# Patient Record
Sex: FEMALE | Race: OTHER | HISPANIC OR LATINO | Employment: UNEMPLOYED | URBAN - METROPOLITAN AREA
[De-identification: names, ages, dates, MRNs, and addresses within clinical notes are randomized per-mention and may not be internally consistent; named-entity substitution may affect disease eponyms.]

---

## 2023-01-27 DIAGNOSIS — E78.2 MIXED HYPERLIPIDEMIA: ICD-10-CM

## 2023-01-27 RX ORDER — ATORVASTATIN CALCIUM 10 MG/1
10 TABLET, FILM COATED ORAL DAILY
Qty: 90 TABLET | Refills: 2 | Status: SHIPPED | OUTPATIENT
Start: 2023-01-27

## 2023-02-16 PROBLEM — Z28.21 IMMUNIZATION DECLINED: Status: RESOLVED | Noted: 2022-11-15 | Resolved: 2023-02-16

## 2023-05-24 ENCOUNTER — HOSPITAL ENCOUNTER (EMERGENCY)
Facility: HOSPITAL | Age: 58
Discharge: HOME | End: 2023-05-24
Attending: EMERGENCY MEDICINE

## 2023-05-24 VITALS
DIASTOLIC BLOOD PRESSURE: 77 MMHG | RESPIRATION RATE: 18 BRPM | HEART RATE: 86 BPM | TEMPERATURE: 98.7 F | OXYGEN SATURATION: 96 % | TEMPERATURE: 98.7 F | WEIGHT: 230.1 LBS | OXYGEN SATURATION: 96 % | RESPIRATION RATE: 18 BRPM | SYSTOLIC BLOOD PRESSURE: 134 MMHG | DIASTOLIC BLOOD PRESSURE: 77 MMHG | HEART RATE: 86 BPM | WEIGHT: 230.1 LBS | SYSTOLIC BLOOD PRESSURE: 134 MMHG

## 2023-05-24 DIAGNOSIS — T78.40XA ALLERGIC REACTION, INITIAL ENCOUNTER: Primary | ICD-10-CM

## 2023-05-24 RX ORDER — FAMOTIDINE 10 MG/ML
20 INJECTION, SOLUTION INTRAVENOUS ONCE
Status: COMPLETED | OUTPATIENT
Start: 2023-05-24 | End: 2023-05-24

## 2023-05-24 RX ORDER — EPINEPHRINE 1 MG/ML
0.3 INJECTION, SOLUTION, CONCENTRATE INTRAVENOUS ONCE
Status: COMPLETED | OUTPATIENT
Start: 2023-05-24 | End: 2023-05-24

## 2023-05-24 RX ORDER — METHYLPREDNISOLONE 4 MG/1
TABLET ORAL
Qty: 21 TABLET | Refills: 0 | Status: SHIPPED | OUTPATIENT
Start: 2023-05-24

## 2023-05-24 RX ORDER — CANDESARTAN 8 MG/1
8 TABLET ORAL DAILY
Qty: 30 TABLET | Refills: 0 | Status: SHIPPED | OUTPATIENT
Start: 2023-05-24

## 2023-05-24 RX ORDER — DIPHENHYDRAMINE HCL 25 MG
25 TABLET ORAL EVERY 6 HOURS PRN
Qty: 30 TABLET | Refills: 0 | Status: SHIPPED | OUTPATIENT
Start: 2023-05-24

## 2023-05-24 RX ORDER — EPINEPHRINE 0.3 MG/.3ML
0.3 INJECTION SUBCUTANEOUS ONCE
Qty: 0.6 ML | Refills: 0 | Status: SHIPPED | OUTPATIENT
Start: 2023-05-24 | End: 2023-05-24

## 2023-05-24 RX ORDER — DIPHENHYDRAMINE HYDROCHLORIDE 50 MG/ML
25 INJECTION INTRAMUSCULAR; INTRAVENOUS ONCE
Status: COMPLETED | OUTPATIENT
Start: 2023-05-24 | End: 2023-05-24

## 2023-05-24 RX ORDER — METHYLPREDNISOLONE SODIUM SUCCINATE 125 MG/2ML
125 INJECTION, POWDER, LYOPHILIZED, FOR SOLUTION INTRAMUSCULAR; INTRAVENOUS ONCE
Status: COMPLETED | OUTPATIENT
Start: 2023-05-24 | End: 2023-05-24

## 2023-05-24 RX ORDER — FAMOTIDINE 20 MG/1
20 TABLET, FILM COATED ORAL 2 TIMES DAILY
Qty: 30 TABLET | Refills: 0 | Status: SHIPPED | OUTPATIENT
Start: 2023-05-24

## 2023-05-24 RX ADMIN — DIPHENHYDRAMINE HYDROCHLORIDE 25 MG: 50 INJECTION, SOLUTION INTRAMUSCULAR; INTRAVENOUS at 01:17

## 2023-05-24 RX ADMIN — SODIUM CHLORIDE 1000 ML: 0.9 INJECTION, SOLUTION INTRAVENOUS at 01:12

## 2023-05-24 RX ADMIN — FAMOTIDINE 20 MG: 10 INJECTION INTRAVENOUS at 01:15

## 2023-05-24 RX ADMIN — METHYLPREDNISOLONE SODIUM SUCCINATE 125 MG: 125 INJECTION, POWDER, FOR SOLUTION INTRAMUSCULAR; INTRAVENOUS at 01:13

## 2023-05-24 RX ADMIN — EPINEPHRINE 0.3 MG: 1 INJECTION, SOLUTION, CONCENTRATE INTRAVENOUS at 01:18

## 2023-05-24 NOTE — ED PROVIDER NOTES
History  Chief Complaint   Patient presents with   • Allergic Reaction     Patient reports taking dextromorphan and guaifenesin at home for cold at 1700  Began to experience hives on bilateral arms and chest  Took Benadryl at 1800 for hives  Denies SOB but states that she lost her voice  61-year-old female with history of allergy to penicillin presents complaining of diffuse hives and tongue swelling noted after taking Robitussin  Patient is unsure if she ever had this medication in the past   Patient states that her last dose was around 6 PM today however shortly after stated that she felt like she could not swallow and that her tongue was swollen  Patient also noted hives to her arms  Patient tried taking 25 mg of Benadryl at home however became concerned when her tongue and lips started to swell and came to the emergency department  Denies history of similar  Denies any other complaints  History provided by:  Patient   used: No        None       No past medical history on file  No past surgical history on file  No family history on file  I have reviewed and agree with the history as documented  No existing history information found  No existing history information found  Review of Systems   Constitutional: Negative  Negative for chills and fatigue  HENT: Positive for facial swelling  Negative for ear pain and sore throat  Eyes: Negative for photophobia and redness  Respiratory: Negative for apnea, cough and shortness of breath  Cardiovascular: Negative for chest pain  Gastrointestinal: Negative for abdominal pain, nausea and vomiting  Genitourinary: Negative for dysuria  Musculoskeletal: Negative for arthralgias, neck pain and neck stiffness  Skin: Positive for rash  Neurological: Negative for dizziness, tremors, syncope and weakness  Psychiatric/Behavioral: Negative for suicidal ideas         Physical Exam  Physical Exam  Constitutional: General: She is not in acute distress  Appearance: She is well-developed  She is not diaphoretic  HENT:      Mouth/Throat:      Comments: Mild angioedema of the tongue noted  Eyes:      Pupils: Pupils are equal, round, and reactive to light  Cardiovascular:      Rate and Rhythm: Normal rate and regular rhythm  Pulmonary:      Effort: Pulmonary effort is normal  No respiratory distress  Breath sounds: Normal breath sounds  Abdominal:      General: Bowel sounds are normal  There is no distension  Palpations: Abdomen is soft  Musculoskeletal:         General: Normal range of motion  Cervical back: Normal range of motion and neck supple  Skin:     General: Skin is warm and dry  Findings: Rash present  Comments: Raised blanchable erythematous pruritic rash with central clearing noted to bilateral upper extremities chest and back  Neurological:      Mental Status: She is alert and oriented to person, place, and time           Vital Signs  ED Triage Vitals [05/24/23 0055]   Temperature Pulse Respirations Blood Pressure SpO2   98 7 °F (37 1 °C) 104 20 157/86 97 %      Temp Source Heart Rate Source Patient Position - Orthostatic VS BP Location FiO2 (%)   Tympanic Monitor Sitting Left arm --      Pain Score       No Pain           Vitals:    05/24/23 0055   BP: 157/86   Pulse: 104   Patient Position - Orthostatic VS: Sitting         Visual Acuity      ED Medications  Medications   EPINEPHrine PF (ADRENALIN) 1 mg/mL injection 0 3 mg (0 3 mg Intramuscular Given 5/24/23 0118)   diphenhydrAMINE (BENADRYL) injection 25 mg (25 mg Intravenous Given 5/24/23 0117)   sodium chloride 0 9 % bolus 1,000 mL (1,000 mL Intravenous New Bag 5/24/23 0112)   Famotidine (PF) (PEPCID) injection 20 mg (20 mg Intravenous Given 5/24/23 0115)   methylPREDNISolone sodium succinate (Solu-MEDROL) injection 125 mg (125 mg Intravenous Given 5/24/23 0113)       Diagnostic Studies  Results Reviewed     None No orders to display              Procedures  Procedures         ED Course                               SBIRT 20yo+    Flowsheet Row Most Recent Value   Initial Alcohol Screen: US AUDIT-C     1  How often do you have a drink containing alcohol? 0 Filed at: 05/24/2023 0108   2  How many drinks containing alcohol do you have on a typical day you are drinking? 0 Filed at: 05/24/2023 0108   3a  Male UNDER 65: How often do you have five or more drinks on one occasion? 0 Filed at: 05/24/2023 0108   3b  FEMALE Any Age, or MALE 65+: How often do you have 4 or more drinks on one occassion? 0 Filed at: 05/24/2023 0108   Audit-C Score 0 Filed at: 05/24/2023 0108   JOSE D: How many times in the past year have you    Used an illegal drug or used a prescription medication for non-medical reasons? Never Filed at: 05/24/2023 7923                    Medical Decision Making  Discussed with attending  Patient had evidence of multisystemic involvement including noted angioedema with difficulty swallowing as well as rash consistent with urticaria  Strong concern for anaphylaxis at this time  Although vital stable plan to give supportive medications as well as epinephrine  Patient was given epinephrine and had dramatic improvement of symptoms  On reevaluation rash had resolved and patient no longer reported any swelling  Patient was observed in the emergency department for 3 hours without complaint  Patient was given prescription for EpiPen and educated on appropriate use  Given return precautions  Discharged home  Risk  Prescription drug management  Disposition  Final diagnoses:    Allergic reaction, initial encounter     Time reflects when diagnosis was documented in both MDM as applicable and the Disposition within this note     Time User Action Codes Description Comment    5/24/2023  3:31 AM Ct Tabares Add [T78 [id number]] Allergic reaction, initial encounter       ED Disposition     ED Disposition   Discharge    Condition   Stable    Date/Time   Wed May 24, 2023  3:31 AM    Comment   Cathy RamirezDodienatetamie discharge to home/self care  Follow-up Information     Follow up With Specialties Details Why Contact Info Additional [phone] [facility] Emergency Department Emergency Medicine Go to  If symptoms worsen [address] [id number] [address] [facility] Emergency Department          Patient's Medications   Discharge Prescriptions    CANDESARTAN (ATACAND) 8 MG TABLET    Take 1 tablet (8 mg total) by mouth daily       Start Date: 5/24/2023 End Date: --       Order Dose: 8 mg       Quantity: 30 tablet    Refills: 0    DIPHENHYDRAMINE (BENADRYL) 25 MG TABLET    Take 1 tablet (25 mg total) by mouth every 6 (six) hours as needed for itching       Start Date: 5/24/2023 End Date: --       Order Dose: 25 mg       Quantity: 30 tablet    Refills: 0    EPINEPHRINE (EPIPEN) 0 3 MG/0 3 ML SOAJ    Inject 0 3 mL (0 3 mg total) into a muscle once for 1 dose       Start Date: 5/24/2023 End Date: 5/24/2023       Order Dose: 0 3 mg       Quantity: 0 6 mL    Refills: 0    FAMOTIDINE (PEPCID) 20 MG TABLET    Take 1 tablet (20 mg total) by mouth 2 (two) times a day       Start Date: 5/24/2023 End Date: --       Order Dose: 20 mg       Quantity: 30 tablet    Refills: 0    METHYLPREDNISOLONE 4 MG TABLET THERAPY PACK    Use as directed on package       Start Date: 5/24/2023 End Date: --       Order Dose: --       Quantity: 21 tablet    Refills: 0       No discharge procedures on file      PDMP Review     None          ED Provider  Electronically Signed by           Marie Forbes PA-C  05/24/23 9830

## 2023-05-24 NOTE — DISCHARGE INSTRUCTIONS
Take medications as directed  Only use EpiPen for emergencies  After use return to emergency department  Follow-up with primary care

## 2023-07-12 DIAGNOSIS — I10 PRIMARY HYPERTENSION: ICD-10-CM

## 2023-07-12 RX ORDER — CANDESARTAN 8 MG/1
8 TABLET ORAL DAILY
Qty: 180 TABLET | Refills: 0 | Status: SHIPPED | OUTPATIENT
Start: 2023-07-12

## 2023-07-17 ENCOUNTER — TELEPHONE (OUTPATIENT)
Dept: FAMILY MEDICINE CLINIC | Facility: CLINIC | Age: 58
End: 2023-07-17

## 2023-07-17 NOTE — TELEPHONE ENCOUNTER
07/17/23    Pt left message. Was not able to hear message. From I was able to understand from the auto written message Pt wants to reschedule appt. Provided OBGYN Contact # [phone] on Voice Message. If Pt contacts office, please assist Pt with providing again OBGYN Contact Pt.

## 2023-07-19 ENCOUNTER — OFFICE VISIT (OUTPATIENT)
Dept: OBGYN CLINIC | Facility: CLINIC | Age: 58
End: 2023-07-19

## 2023-07-19 VITALS
WEIGHT: 228.4 LBS | BODY MASS INDEX: 36.71 KG/M2 | HEART RATE: 96 BPM | DIASTOLIC BLOOD PRESSURE: 85 MMHG | SYSTOLIC BLOOD PRESSURE: 137 MMHG | HEIGHT: 66 IN

## 2023-07-19 DIAGNOSIS — N94.9 FEMALE GENITAL LESION: Primary | ICD-10-CM

## 2023-07-19 PROCEDURE — 99213 OFFICE O/P EST LOW 20 MIN: CPT | Performed by: OBSTETRICS & GYNECOLOGY

## 2023-07-20 NOTE — PROGRESS NOTES
Assessment/Plan:     No problem-specific Assessment & Plan notes found for this encounter. Diagnoses and all orders for this visit:    Female genital lesion      Small cystic lesion, resolving on its own. RTO for annual exam.         Subjective:      Patient ID: Maryjo Landaverde is a 66 y.o. female presents with a cyst of the perineum. She states she had squeezed it and it has since decreased in size. She denies bleeding or vaginal discharge. HPI    The following portions of the patient's history were reviewed and updated as appropriate: allergies, current medications, past family history, past medical history, past social history, past surgical history and problem list.    Review of Systems      Objective:      /85   Pulse 96   Ht 5' 6" (1.676 m)   Wt 104 kg (228 lb 6.4 oz)   BMI 36.86 kg/m²          Physical Exam  Vitals and nursing note reviewed. Exam conducted with a chaperone present. Constitutional:       Appearance: Normal appearance. Pulmonary:      Effort: Pulmonary effort is normal.   Genitourinary:     Labia:         Right: No rash, tenderness, lesion or injury. Left: No rash, tenderness, lesion or injury. Vagina: Normal.          Comments: Cyst on perineum  Neurological:      General: No focal deficit present. Mental Status: She is alert and oriented to person, place, and time.    Psychiatric:         Mood and Affect: Mood normal.         Behavior: Behavior normal.

## 2023-09-18 ENCOUNTER — HOSPITAL ENCOUNTER (OUTPATIENT)
Dept: MAMMOGRAPHY | Facility: CLINIC | Age: 58
Discharge: HOME | End: 2023-09-18

## 2023-09-18 VITALS — WEIGHT: 228 LBS | HEIGHT: 66 IN | BODY MASS INDEX: 36.64 KG/M2

## 2023-09-18 DIAGNOSIS — Z12.31 ENCOUNTER FOR SCREENING MAMMOGRAM FOR MALIGNANT NEOPLASM OF BREAST: ICD-10-CM

## 2023-09-18 DIAGNOSIS — Z12.39 ENCOUNTER FOR SCREENING FOR MALIGNANT NEOPLASM OF BREAST, UNSPECIFIED SCREENING MODALITY: ICD-10-CM

## 2023-09-18 PROCEDURE — 77063 BREAST TOMOSYNTHESIS BI: CPT

## 2023-09-18 PROCEDURE — 77067 SCR MAMMO BI INCL CAD: CPT

## 2023-12-26 DIAGNOSIS — I10 PRIMARY HYPERTENSION: ICD-10-CM

## 2023-12-26 RX ORDER — CANDESARTAN 8 MG/1
8 TABLET ORAL DAILY
Qty: 180 TABLET | Refills: 0 | OUTPATIENT
Start: 2023-12-26

## 2023-12-27 DIAGNOSIS — I10 PRIMARY HYPERTENSION: ICD-10-CM

## 2023-12-27 RX ORDER — CANDESARTAN 8 MG/1
8 TABLET ORAL DAILY
Qty: 30 TABLET | Refills: 0 | Status: SHIPPED | OUTPATIENT
Start: 2023-12-27

## 2024-01-09 ENCOUNTER — TELEPHONE (OUTPATIENT)
Dept: FAMILY MEDICINE CLINIC | Facility: CLINIC | Age: 59
End: 2024-01-09

## 2024-01-09 NOTE — TELEPHONE ENCOUNTER
Patient message: Buenas tardes, mi nombre es Emily Gregorio, yo tengo win tyson para el día 15. A las 17:40 H de la tarde. ¿Vance qué sucede? Yo tengo alfonso un hongo en la nariz, tengo ya más de 2 semanas con eso ya yo sé que no era el frío, entonces yo quisiera que me llamaran para carol si me ponen breonna tyson con cualquier médico, o sea un médico que me pueda decir qué es lo que tengo en la nariz, pues estoy, tengo la nariz pelada y la tengo seca, la tengo, no la tengo cuate la nariz. Entonces mi número es [phone]. Mi fecha de nacimiento, [birth year] de abril de 1965. Ya me pueden llamar ahí Josafati Gregorio para que me digan algo, porque si cualquier médico me puede chequear. Es. Que con Kar así también cualquier médico que sea general me chequea de win vez la presión, porque para eso, para monitorearme la presión fue que me pusieron breonna tyson porque yo no tengo otra cosa, no tengo más nada, muchas jose y pasen buenas tardes.    Erick schedule 1/10/24

## 2024-01-10 ENCOUNTER — OFFICE VISIT (OUTPATIENT)
Dept: FAMILY MEDICINE CLINIC | Facility: CLINIC | Age: 59
End: 2024-01-10

## 2024-01-10 VITALS
BODY MASS INDEX: 37.45 KG/M2 | OXYGEN SATURATION: 96 % | DIASTOLIC BLOOD PRESSURE: 84 MMHG | HEART RATE: 88 BPM | SYSTOLIC BLOOD PRESSURE: 126 MMHG | RESPIRATION RATE: 18 BRPM | HEIGHT: 66 IN | TEMPERATURE: 98.5 F | WEIGHT: 233 LBS

## 2024-01-10 DIAGNOSIS — L30.9 DERMATITIS: Primary | ICD-10-CM

## 2024-01-10 DIAGNOSIS — Z20.822 CLOSE EXPOSURE TO COVID-19 VIRUS: ICD-10-CM

## 2024-01-10 DIAGNOSIS — R06.02 SHORTNESS OF BREATH: ICD-10-CM

## 2024-01-10 DIAGNOSIS — J34.89 NASAL DRYNESS: ICD-10-CM

## 2024-01-10 LAB
SARS-COV-2 AG UPPER RESP QL IA: NEGATIVE
VALID CONTROL: NORMAL

## 2024-01-10 PROCEDURE — 87811 SARS-COV-2 COVID19 W/OPTIC: CPT

## 2024-01-10 PROCEDURE — 99214 OFFICE O/P EST MOD 30 MIN: CPT

## 2024-01-10 RX ORDER — TRIAMCINOLONE ACETONIDE 1 MG/G
CREAM TOPICAL 2 TIMES DAILY
Qty: 45 G | Refills: 0 | Status: SHIPPED | OUTPATIENT
Start: 2024-01-10

## 2024-01-10 NOTE — PROGRESS NOTES
Name: Marbin Bowden      : 1965      MRN: [id number]  Encounter Provider: SELVIN Inman  Encounter Date: 1/10/2024   Encounter department: [facility]    Assessment & Plan     1. Dermatitis  Assessment & Plan:  - Apply triamcinolone to edge of both nares BID x7 days.  - Moisturize inside of nostrils with Ocean nasal spray PRN.    Orders:  -     triamcinolone (KENALOG) 0.1 % cream; Apply topically 2 (two) times a day    2. Nasal dryness  Assessment & Plan:  - Apply triamcinolone to edge of both nares BID x7 days.  - Moisturize inside of nostrils with Ocean nasal spray PRN.  - Avoid Flonase which can cause worsening bleeding.    Orders:  -     sodium chloride (OCEAN) 0.65 % nasal spray; 2 sprays into each nostril as needed (nasal dryness)    3. Shortness of breath  Assessment & Plan:  Reports shortness of breath with activity without accompanying symptoms. Lungs clear, O2 96%.  - Discussed possible causes, testing, and treatment, however pt declines all at this time.   - Return if is symptoms worsen, change, or fail to improve.       4. Close exposure to COVID-19 virus  -     POCT Rapid Covid Ag           Subjective     HPI    [place] language interpretation services were utilized for this visit.  Marbin presents to the office for a SAME DAY appt for c/o dry, peeling skin around the edge of her nostrils x3 weeks. She reports dry skin and dried blood inside her nostrils as well. Denies nosebleed, congestion/rhinorrhea, fever, or any recent illness. She states that 2 people in her home are currently COVID positive. Pt tried applying acyclovir cream to her nose for 4 days.  Pt also states that for at least a month she has become easily out of breath with everyday activity such as climbing stairs or dancing. She denies chest pain, palpations, dizziness, or any other accompanying symptoms. Pt states she used someone else's inhaler once and it was effective.          Review of Systems   Constitutional:  Negative for fatigue, fever and unexpected weight change.   HENT:  Negative for congestion, ear discharge, ear pain, nosebleeds, rhinorrhea and sore throat.    Eyes:  Negative for visual disturbance.   Respiratory:  Positive for shortness of breath. Negative for cough, chest tightness and wheezing.    Cardiovascular:  Negative for chest pain, palpitations and leg swelling.   Gastrointestinal:  Negative for abdominal pain, diarrhea, nausea and vomiting.   Skin:  Positive for rash.   Neurological:  Negative for dizziness, light-headedness and headaches.   All other systems reviewed and are negative.      Past Medical History:   Diagnosis Date    Hypertension      Past Surgical History:   Procedure Laterality Date    HYSTERECTOMY      TUBAL LIGATION       Family History   Problem Relation Age of Onset    Hypertension Mother     No Known Problems Father     No Known Problems Maternal Grandmother     No Known Problems Maternal Grandfather     No Known Problems Paternal Grandmother     No Known Problems Paternal Grandfather     No Known Problems Son     No Known Problems Son     Breast cancer Neg Hx      Social History     Socioeconomic History    Marital status: Single     Spouse name: None    Number of children: None    Years of education: None    Highest education level: None   Occupational History    None   Tobacco Use    Smoking status: Never     Passive exposure: Never    Smokeless tobacco: Never   Vaping Use    Vaping status: Never Used   Substance and Sexual Activity    Alcohol use: Yes     Comment: Socially    Drug use: Never    Sexual activity: Not Currently     Partners: Male   Other Topics Concern    None   Social History Narrative    None     Social Determinants of Health     Financial Resource Strain: Medium Risk (7/19/2023)    Overall Financial Resource Strain (CARDIA)     Difficulty of Paying Living Expenses: Somewhat hard   Food Insecurity: Food Insecurity Present  (7/19/2023)    Hunger Vital Sign     Worried About Running Out of Food in the Last Year: Sometimes true     Ran Out of Food in the Last Year: Sometimes true   Transportation Needs: No Transportation Needs (7/19/2023)    PRAPARE - Transportation     Lack of Transportation (Medical): No     Lack of Transportation (Non-Medical): No   Physical Activity: Not on file   Stress: Stress Concern Present (6/24/2022)    [employer] [place] - Occupational Stress Questionnaire     Feeling of Stress : To some extent   Social Connections: Not on file   Intimate Partner Violence: Not on file   Housing Stability: Unknown (6/24/2022)    Housing Stability Vital Sign     Unable to Pay for Housing in the Last Year: No     Number of Places Lived in the Last Year: Not on file     Unstable Housing in the Last Year: No     Current Outpatient Medications on File Prior to Visit   Medication Sig    acetaminophen (TYLENOL) 500 mg tablet Take 2 tablets (1,000 mg total) by mouth every 8 (eight) hours as needed for mild pain, moderate pain, headaches or fever    atorvastatin (LIPITOR) 10 mg tablet Take 1 tablet (10 mg total) by mouth daily    candesartan (ATACAND) 8 MG tablet Take 1 tablet (8 mg total) by mouth daily    diphenhydrAMINE (BENADRYL) 25 mg tablet Take 1 tablet (25 mg total) by mouth every 6 (six) hours as needed for itching (Patient not taking: Reported on 7/19/2023)    EPINEPHrine (EPIPEN) 0.3 mg/0.3 mL SOAJ Inject 0.3 mL (0.3 mg total) into a muscle once for 1 dose    famotidine (PEPCID) 20 mg tablet Take 1 tablet (20 mg total) by mouth 2 (two) times a day (Patient not taking: Reported on 7/19/2023)    methylPREDNISolone 4 MG tablet therapy pack Use as directed on package (Patient not taking: Reported on 7/19/2023)     Allergies   Allergen Reactions    Penicillins Rash     Immunization History   Administered Date(s) Administered    COVID-19 PFIZER VACCINE 0.3 ML IM 08/13/2021, 09/03/2021       Objective     BP  "126/84 (BP Location: Left arm, Patient Position: Sitting, Cuff Size: Large)   Pulse 88   Temp 98.5 °F (36.9 °C) (Temporal)   Resp 18   Ht 5' 6\" (1.676 m)   Wt 106 kg (233 lb)   SpO2 96%   BMI 37.61 kg/m²     Physical Exam  Vitals reviewed.   Constitutional:       General: She is not in acute distress.     Appearance: She is obese. She is not ill-appearing or diaphoretic.   HENT:      Head: Normocephalic and atraumatic.      Right Ear: Tympanic membrane, ear canal and external ear normal.      Left Ear: Tympanic membrane, ear canal and external ear normal.      Nose: Rhinorrhea present.      Comments: Dry, peeling skin around both nares and just inside.     Mouth/Throat:      Mouth: Mucous membranes are moist.      Pharynx: Oropharynx is clear.      Tonsils: No tonsillar exudate.   Eyes:      General: Lids are normal.      Conjunctiva/sclera: Conjunctivae normal.      Pupils: Pupils are equal, round, and reactive to light.   Cardiovascular:      Rate and Rhythm: Normal rate and regular rhythm.      Pulses: Normal pulses.      Heart sounds: Normal heart sounds. No murmur heard.  Pulmonary:      Effort: Pulmonary effort is normal. No tachypnea.      Breath sounds: Normal breath sounds. No decreased breath sounds, wheezing, rhonchi or rales.   Musculoskeletal:      Cervical back: Neck supple.      Right lower leg: No edema.      Left lower leg: No edema.   Lymphadenopathy:      Cervical: No cervical adenopathy.   Skin:     General: Skin is warm and dry.      Findings: Rash (dry, peeling/cracking bilateral nares) present. Rash is not crusting, macular, papular, pustular, scaling or vesicular.   Neurological:      Mental Status: She is alert and oriented to person, place, and time.      Cranial Nerves: No cranial nerve deficit, dysarthria or facial asymmetry.      Gait: Gait is intact.   Psychiatric:         Attention and Perception: Attention normal.         Mood and Affect: Mood and affect normal.         " Behavior: Behavior normal.       SELVIN Inman

## 2024-01-11 PROBLEM — R06.02 SHORTNESS OF BREATH: Status: ACTIVE | Noted: 2024-01-11

## 2024-01-11 PROBLEM — L30.9 DERMATITIS: Status: ACTIVE | Noted: 2024-01-11

## 2024-01-11 PROBLEM — J34.89 NASAL DRYNESS: Status: ACTIVE | Noted: 2024-01-11

## 2024-01-12 NOTE — ASSESSMENT & PLAN NOTE
- Apply triamcinolone to edge of both nares BID x7 days.  - Moisturize inside of nostrils with Ocean nasal spray PRN.

## 2024-01-12 NOTE — ASSESSMENT & PLAN NOTE
- Apply triamcinolone to edge of both nares BID x7 days.  - Moisturize inside of nostrils with Ocean nasal spray PRN.  - Avoid Flonase which can cause worsening bleeding.

## 2024-01-12 NOTE — ASSESSMENT & PLAN NOTE
Reports shortness of breath with activity without accompanying symptoms. Lungs clear, O2 96%.  - Discussed possible causes, testing, and treatment, however pt declines all at this time.   - Return if is symptoms worsen, change, or fail to improve.

## 2024-01-20 DIAGNOSIS — I10 PRIMARY HYPERTENSION: ICD-10-CM

## 2024-01-22 RX ORDER — CANDESARTAN 8 MG/1
8 TABLET ORAL DAILY
Qty: 90 TABLET | Refills: 1 | Status: SHIPPED | OUTPATIENT
Start: 2024-01-22

## 2024-05-23 ENCOUNTER — OFFICE VISIT (OUTPATIENT)
Dept: FAMILY MEDICINE CLINIC | Facility: CLINIC | Age: 59
End: 2024-05-23

## 2024-05-23 VITALS
OXYGEN SATURATION: 99 % | BODY MASS INDEX: 37.12 KG/M2 | DIASTOLIC BLOOD PRESSURE: 80 MMHG | WEIGHT: 231 LBS | RESPIRATION RATE: 16 BRPM | HEART RATE: 91 BPM | HEIGHT: 66 IN | SYSTOLIC BLOOD PRESSURE: 120 MMHG | TEMPERATURE: 98 F

## 2024-05-23 DIAGNOSIS — I10 PRIMARY HYPERTENSION: ICD-10-CM

## 2024-05-23 DIAGNOSIS — Z00.00 ANNUAL PHYSICAL EXAM: Primary | ICD-10-CM

## 2024-05-23 DIAGNOSIS — E78.2 MIXED HYPERLIPIDEMIA: ICD-10-CM

## 2024-05-23 DIAGNOSIS — F51.01 PRIMARY INSOMNIA: ICD-10-CM

## 2024-05-23 PROCEDURE — 99396 PREV VISIT EST AGE 40-64: CPT

## 2024-05-23 PROCEDURE — 99214 OFFICE O/P EST MOD 30 MIN: CPT

## 2024-05-23 RX ORDER — HYDROXYZINE PAMOATE 25 MG/1
25 CAPSULE ORAL
Qty: 60 CAPSULE | Refills: 0 | Status: SHIPPED | OUTPATIENT
Start: 2024-05-23

## 2024-05-23 RX ORDER — METHOCARBAMOL 750 MG/1
750 TABLET, FILM COATED ORAL 3 TIMES DAILY PRN
COMMUNITY
Start: 2024-05-17 | End: 2024-05-27

## 2024-05-23 RX ORDER — CANDESARTAN 8 MG/1
8 TABLET ORAL DAILY
Qty: 90 TABLET | Refills: 1 | Status: SHIPPED | OUTPATIENT
Start: 2024-05-23

## 2024-05-23 NOTE — ASSESSMENT & PLAN NOTE
BP Readings from Last 3 Encounters:   05/23/24 120/80   01/10/24 126/84   07/19/23 137/85     - At goal. Continue candesartan

## 2024-05-23 NOTE — ASSESSMENT & PLAN NOTE
Reports that  trazodone did not work. Trial vistaril. Discussed good sleep hygiene. F/u 3 months. Consider sleep med referral at next OV

## 2024-05-23 NOTE — ASSESSMENT & PLAN NOTE
Lab Results   Component Value Date    CHOLESTEROL 277 (H) 06/01/2022     Lab Results   Component Value Date    HDL 68 06/01/2022     Lab Results   Component Value Date    TRIG 106 06/01/2022     Lab Results   Component Value Date    NONHDLC 209 06/01/2022     Check lipid panel. Continue lipitor

## 2024-06-07 ENCOUNTER — OFFICE VISIT (OUTPATIENT)
Dept: FAMILY MEDICINE CLINIC | Facility: CLINIC | Age: 59
End: 2024-06-07

## 2024-06-07 VITALS
HEIGHT: 66 IN | OXYGEN SATURATION: 98 % | RESPIRATION RATE: 18 BRPM | TEMPERATURE: 97.5 F | WEIGHT: 229 LBS | DIASTOLIC BLOOD PRESSURE: 76 MMHG | SYSTOLIC BLOOD PRESSURE: 113 MMHG | BODY MASS INDEX: 36.8 KG/M2 | HEART RATE: 78 BPM

## 2024-06-07 DIAGNOSIS — A60.00 HERPES SIMPLEX INFECTION OF GENITOURINARY SYSTEM: ICD-10-CM

## 2024-06-07 DIAGNOSIS — R10.2 PELVIC PAIN: Primary | ICD-10-CM

## 2024-06-07 DIAGNOSIS — K59.01 SLOW TRANSIT CONSTIPATION: ICD-10-CM

## 2024-06-07 LAB
SL AMB  POCT GLUCOSE, UA: NORMAL
SL AMB LEUKOCYTE ESTERASE,UA: NEGATIVE
SL AMB POCT BILIRUBIN,UA: NORMAL
SL AMB POCT BLOOD,UA: NEGATIVE
SL AMB POCT CLARITY,UA: YELLOW
SL AMB POCT COLOR,UA: YELLOW
SL AMB POCT KETONES,UA: NEGATIVE
SL AMB POCT NITRITE,UA: NEGATIVE
SL AMB POCT PH,UA: 6
SL AMB POCT SPECIFIC GRAVITY,UA: 1.02
SL AMB POCT URINE PROTEIN: NEGATIVE
SL AMB POCT UROBILINOGEN: 0.2

## 2024-06-07 PROCEDURE — 87491 CHLMYD TRACH DNA AMP PROBE: CPT | Performed by: STUDENT IN AN ORGANIZED HEALTH CARE EDUCATION/TRAINING PROGRAM

## 2024-06-07 PROCEDURE — 81002 URINALYSIS NONAUTO W/O SCOPE: CPT | Performed by: STUDENT IN AN ORGANIZED HEALTH CARE EDUCATION/TRAINING PROGRAM

## 2024-06-07 PROCEDURE — 87591 N.GONORRHOEAE DNA AMP PROB: CPT | Performed by: STUDENT IN AN ORGANIZED HEALTH CARE EDUCATION/TRAINING PROGRAM

## 2024-06-07 PROCEDURE — 99213 OFFICE O/P EST LOW 20 MIN: CPT | Performed by: STUDENT IN AN ORGANIZED HEALTH CARE EDUCATION/TRAINING PROGRAM

## 2024-06-07 PROCEDURE — 87563 M. GENITALIUM AMP PROBE: CPT | Performed by: STUDENT IN AN ORGANIZED HEALTH CARE EDUCATION/TRAINING PROGRAM

## 2024-06-07 PROCEDURE — 87798 DETECT AGENT NOS DNA AMP: CPT | Performed by: STUDENT IN AN ORGANIZED HEALTH CARE EDUCATION/TRAINING PROGRAM

## 2024-06-07 RX ORDER — SENNA AND DOCUSATE SODIUM 50; 8.6 MG/1; MG/1
1 TABLET, FILM COATED ORAL DAILY
Qty: 7 TABLET | Refills: 0 | Status: SHIPPED | OUTPATIENT
Start: 2024-06-07

## 2024-06-07 RX ORDER — BISACODYL 10 MG
10 SUPPOSITORY, RECTAL RECTAL DAILY
Qty: 12 SUPPOSITORY | Refills: 0 | Status: SHIPPED | OUTPATIENT
Start: 2024-06-07

## 2024-06-07 RX ORDER — VALACYCLOVIR HYDROCHLORIDE 1 G/1
1000 TABLET, FILM COATED ORAL 2 TIMES DAILY
Qty: 20 TABLET | Refills: 1 | Status: SHIPPED | OUTPATIENT
Start: 2024-06-07 | End: 2024-06-27

## 2024-06-07 NOTE — PROGRESS NOTES
Ambulatory Visit  Name: Marbin Bowden      : 1965      MRN: [id number]  Encounter Provider: Elias Schoen, MD  Encounter Date: 2024   Encounter department: [facility]    Assessment & Plan   1. Pelvic pain  Assessment & Plan:  Likely secondary to constipation.  Will make sure patient does not have a STD.  Patient's urine was completely normal.  - If symptoms not improved with adequately addressing constipation will perform a vaginal exam to rule out pathology, could consider cervical cuff Pap smear  Orders:  -     POCT urine dip  -     Chlamydia/GC amplified DNA by PCR  2. Slow transit constipation  Assessment & Plan:  Patient with constipation, slight abdominal tenderness.  Constipation likely secondary to inadequate fluid intake as well as poor diet.  - Rectal suppositories provided in order to support patient having a full cleanout  - Senokot ordered in order to support patient regulating bowels to normal soft bowel movements daily  - Follow-up as needed if symptoms not improved  Orders:  -     bisacodyl (DULCOLAX) 10 mg suppository; Insert 1 suppository (10 mg total) into the rectum daily  -     senna-docusate sodium (SENOKOT-S) 8.6-50 mg per tablet; Take 1 tablet by mouth daily  -     Chlamydia/GC amplified DNA by PCR  3. Herpes simplex infection of genitourinary system  Assessment & Plan:  Symptoms also could be secondary to vaginal herpes outbreak  - Patient provided with Valtrex in case of an outbreak  - Patient to continue topical therapy as needed/desires  Orders:  -     valACYclovir (VALTREX) 1,000 mg tablet; Take 1 tablet (1,000 mg total) by mouth 2 (two) times a day for 20 days Take 2x per day for 3 days with an outbreak of herpes  -     Chlamydia/GC amplified DNA by PCR         History of Present Illness     Patient is a 59-year-old female here for 2 weeks of pelvic and abdominal pain here for same-day visit    Patient reports patient has been  having several weeks of abdominal pain and pelvic pain, initially she was having some flank pain which is since resolved.  She states she has been having constipation for a while now.  She had a bowel movement today but it was very small.  She has incomplete defecation.    She has a new sexual partner.  She states she had a vaginal herpes outbreak about  to 3 days ago.  She has been putting acyclovir cream on her herpes outbreak which helps to resolve.  She had a new sexual partner within the last several months.  She denies any vaginal discomfort charge, vaginal bleeding or urinary pain.  She is having urinary frequency.    Patient had a complete hysterectomy and one-sided oophorectomy several years ago.  She reports having abnormal Pap smears in the past.      Review of Systems   Constitutional:  Negative for chills and fever.   HENT:  Negative for sore throat.    Respiratory:  Negative for shortness of breath.    Cardiovascular:  Negative for chest pain and palpitations.   Gastrointestinal:  Negative for abdominal pain.   Genitourinary:  Positive for frequency, genital sores and vaginal pain. Negative for difficulty urinating, dysuria, menstrual problem, vaginal bleeding and vaginal discharge.   Musculoskeletal:  Negative for myalgias.   Neurological:  Negative for weakness.     Past Medical History:   Diagnosis Date    Hypertension      Past Surgical History:   Procedure Laterality Date    HYSTERECTOMY      TUBAL LIGATION       Family History   Problem Relation Age of Onset    Hypertension Mother     No Known Problems Father     No Known Problems Maternal Grandmother     No Known Problems Maternal Grandfather     No Known Problems Paternal Grandmother     No Known Problems Paternal Grandfather     No Known Problems Son     No Known Problems Son     Breast cancer Neg Hx      Social History     Tobacco Use    Smoking status: Never     Passive exposure: Never    Smokeless tobacco: Never   Vaping Use    Vaping  "status: Never Used   Substance and Sexual Activity    Alcohol use: Yes     Comment: Socially    Drug use: Never    Sexual activity: Not Currently     Partners: Male     Current Outpatient Medications on File Prior to Visit   Medication Sig    acetaminophen (TYLENOL) 500 mg tablet Take 2 tablets (1,000 mg total) by mouth every 8 (eight) hours as needed for mild pain, moderate pain, headaches or fever    atorvastatin (LIPITOR) 10 mg tablet Take 1 tablet (10 mg total) by mouth daily    candesartan (ATACAND) 8 MG tablet Take 1 tablet (8 mg total) by mouth daily    hydrOXYzine pamoate (VISTARIL) 25 mg capsule Take 1 capsule (25 mg total) by mouth daily at bedtime    sodium chloride (OCEAN) 0.65 % nasal spray 2 sprays into each nostril as needed (nasal dryness)     Allergies   Allergen Reactions    Penicillins Rash     Immunization History   Administered Date(s) Administered    COVID-19 PFIZER VACCINE 0.3 ML IM 08/13/2021, 09/03/2021     Objective     /76 (BP Location: Left arm, Patient Position: Sitting, Cuff Size: Large)   Pulse 78   Temp 97.5 °F (36.4 °C) (Temporal)   Resp 18   Ht 5' 6\" (1.676 m)   Wt 104 kg (229 lb)   SpO2 98%   BMI 36.96 kg/m²     Physical Exam  Constitutional:       General: She is not in acute distress.     Appearance: Normal appearance.   Cardiovascular:      Rate and Rhythm: Normal rate.   Pulmonary:      Effort: No respiratory distress.   Abdominal:      General: Abdomen is flat. There is no distension.      Palpations: Abdomen is soft. There is no mass.      Tenderness: There is abdominal tenderness. There is no guarding or rebound.   Musculoskeletal:         General: Normal range of motion.      Cervical back: Normal range of motion.   Neurological:      General: No focal deficit present.      Mental Status: She is alert and oriented to person, place, and time.       Administrative Statements         "

## 2024-06-09 PROBLEM — R10.2 PELVIC PAIN: Status: ACTIVE | Noted: 2024-06-09

## 2024-06-09 NOTE — ASSESSMENT & PLAN NOTE
Symptoms also could be secondary to vaginal herpes outbreak  - Patient provided with Valtrex in case of an outbreak  - Patient to continue topical therapy as needed/desires

## 2024-06-09 NOTE — ASSESSMENT & PLAN NOTE
Likely secondary to constipation.  Will make sure patient does not have a STD.  Patient's urine was completely normal.  - If symptoms not improved with adequately addressing constipation will perform a vaginal exam to rule out pathology, could consider cervical cuff Pap smear

## 2024-06-09 NOTE — ASSESSMENT & PLAN NOTE
Patient with constipation, slight abdominal tenderness.  Constipation likely secondary to inadequate fluid intake as well as poor diet.  - Rectal suppositories provided in order to support patient having a full cleanout  - Senokot ordered in order to support patient regulating bowels to normal soft bowel movements daily  - Follow-up as needed if symptoms not improved

## 2024-06-10 LAB
C TRACH DNA SPEC QL NAA+PROBE: NEGATIVE
N GONORRHOEA DNA SPEC QL NAA+PROBE: NEGATIVE

## 2024-06-25 ENCOUNTER — TELEPHONE (OUTPATIENT)
Dept: FAMILY MEDICINE CLINIC | Facility: CLINIC | Age: 59
End: 2024-06-25

## 2024-06-27 ENCOUNTER — OFFICE VISIT (OUTPATIENT)
Dept: DENTISTRY | Facility: CLINIC | Age: 59
End: 2024-06-27

## 2024-06-27 VITALS — DIASTOLIC BLOOD PRESSURE: 79 MMHG | SYSTOLIC BLOOD PRESSURE: 122 MMHG | HEART RATE: 74 BPM | TEMPERATURE: 97.7 F

## 2024-06-27 DIAGNOSIS — Z01.20 ENCOUNTER FOR DENTAL EXAMINATION: Primary | ICD-10-CM

## 2024-06-27 PROCEDURE — D0150 COMPREHENSIVE ORAL EVALUATION - NEW OR ESTABLISHED PATIENT: HCPCS | Performed by: DENTIST

## 2024-06-27 PROCEDURE — D0210 INTRAORAL - COMPLETE SERIES OF RADIOGRAPHIC IMAGES: HCPCS | Performed by: DENTAL HYGIENIST

## 2024-06-27 NOTE — DENTAL PROCEDURE DETAILS
COMP EXAM, FMX, PROBE EXAM   REVIEWED MED HX: meds, allergies, health changes reviewed in EPIC  CHIEF CONCERN:     -Last dental visit was years ago  PAIN SCALE:  0  ASA CLASS:  ASA 2 - Patient with mild systemic disease with no functional limitations  PLAQUE:  moderate  CALCULUS: Light  Moderate  BLEEDING:  light  STAIN :  Light  PERIO: mild bone loss, slight recession  ---FMP - 1-4 mm e/ light BUP  ---Stage 1-2, Grade A    Visual and Tactile Intraoral/ Extraoral evaluation: Oral and Oropharyngeal cancer evaluation. No findings     Dr. Romo -  Reviewed with patient clinical and radiographic findings and patient verbalized understanding. All questions and concerns addressed.     REFERRALS: Endodontist referral provided and     CARIES FINDINGS:  20 - DO  ---Watch areas as charted  ---Previous RCT #9 and maxillary bridge done about 20 yrs ago.  #9 has apical abscess and margin leakage on the distal.  Needs a retreat.  Referred to an Endodontist and     TRANSLATION:  I-PAD French -  # [id number] -- 15 min       NEXT VISIT:   NV1:  Initial prophy - 50 min  NV2:  Rest 20 - DO - 60 min    Last FMX :  6/27/24

## 2024-07-10 ENCOUNTER — TELEPHONE (OUTPATIENT)
Dept: FAMILY MEDICINE CLINIC | Facility: CLINIC | Age: 59
End: 2024-07-10

## 2024-07-10 NOTE — TELEPHONE ENCOUNTER
Pt left message on nurse line. Unable to hear/understand the message.     Unable to reach Pt, Lvm asking for a call back.

## 2024-07-11 ENCOUNTER — TELEPHONE (OUTPATIENT)
Dept: FAMILY MEDICINE CLINIC | Facility: CLINIC | Age: 59
End: 2024-07-11

## 2024-07-11 NOTE — TELEPHONE ENCOUNTER
Pt requested refill of candesartan (ATACAND) 8 MG tablet     Pt informed that medication was sent on 5/23/24 with 3 month supply and 1 refill    Advised to call back if she encounters any issue with her medication

## 2024-07-18 ENCOUNTER — ANNUAL EXAM (OUTPATIENT)
Dept: OBGYN CLINIC | Facility: CLINIC | Age: 59
End: 2024-07-18

## 2024-07-18 VITALS
DIASTOLIC BLOOD PRESSURE: 83 MMHG | HEART RATE: 82 BPM | SYSTOLIC BLOOD PRESSURE: 144 MMHG | HEIGHT: 66 IN | WEIGHT: 231.2 LBS | BODY MASS INDEX: 37.16 KG/M2

## 2024-07-18 DIAGNOSIS — Z12.31 ENCOUNTER FOR SCREENING MAMMOGRAM FOR MALIGNANT NEOPLASM OF BREAST: Primary | ICD-10-CM

## 2024-07-18 PROCEDURE — 99396 PREV VISIT EST AGE 40-64: CPT | Performed by: OBSTETRICS & GYNECOLOGY

## 2024-07-18 NOTE — PATIENT INSTRUCTIONS
Fernandez por eastman confianza en nuestro equipo.   Le agradecemos y agradecemos jed comentarios.   Si recibe win encuesta nuestra, tómese unos momentos para informarnos cómo estamos.   Sinceramente,  Yadarriuse Toussaint-Foster, DO

## 2024-07-18 NOTE — PROGRESS NOTES
ANNUAL GYNECOLOGICAL EXAMINATION    Marbin Bowden is a 59 y.o. female who presents today for annual GYN exam.  Her last pap smear was performed 22 and result was negative.  She reports  history of abnormal pap smears in her past.  Her last mammogram was performed 23 and result was negative.  She had colon cancer screening performed 12/15/22.  She had HIV screening performed 22 and it was negative.  No LMP recorded. Patient has had a hysterectomy.  Her general medical history has been reviewed and she reports it as follows:    Past Medical History:   Diagnosis Date    Hypertension      Past Surgical History:   Procedure Laterality Date    HYSTERECTOMY      TUBAL LIGATION       OB History          4    Para   2    Term   2            AB   2    Living   2         SAB   2    IAB        Ectopic        Multiple        Live Births   2               Social History     Tobacco Use    Smoking status: Never     Passive exposure: Never    Smokeless tobacco: Never   Vaping Use    Vaping status: Never Used   Substance Use Topics    Alcohol use: Yes     Comment: Socially    Drug use: Never     Social History     Substance and Sexual Activity   Sexual Activity Yes    Partners: Male    Birth control/protection: Female Sterilization     Cancer-related family history is negative for Breast cancer.    Current Outpatient Medications   Medication Instructions    acetaminophen (TYLENOL) 1,000 mg, Oral, Every 8 hours PRN    atorvastatin (LIPITOR) 10 mg, Oral, Daily    bisacodyl (DULCOLAX) 10 mg, Rectal, Daily    candesartan (ATACAND) 8 mg, Oral, Daily    hydrOXYzine pamoate (VISTARIL) 25 mg, Oral, Daily at bedtime    senna-docusate sodium (SENOKOT-S) 8.6-50 mg per tablet 1 tablet, Oral, Daily    sodium chloride (OCEAN) 0.65 % nasal spray 2 sprays, Nasal, As needed    valACYclovir (VALTREX) 1,000 mg, Oral, 2 times daily, Take 2x per day for 3 days with an outbreak of herpes       Review of  "Systems:  Review of Systems   Genitourinary:  Negative for pelvic pain, vaginal bleeding and vaginal discharge.   All other systems reviewed and are negative.      Physical Exam:  /83 (BP Location: Left arm, Patient Position: Sitting, Cuff Size: Large)   Pulse 82   Ht 5' 6\" (1.676 m)   Wt 105 kg (231 lb 3.2 oz)   BMI 37.32 kg/m²   Physical Exam  Constitutional:       Appearance: Normal appearance.   Genitourinary:      Bladder and urethral meatus normal.      Right Labia: No rash, tenderness, lesions or skin changes.     Left Labia: No tenderness, lesions, skin changes or rash.     No inguinal adenopathy present in the right or left side.     Vaginal cuff intact.     No vaginal discharge or bleeding.        Right Adnexa: not tender, not full and no mass present.     Left Adnexa: not tender, not full and no mass present.     Cervix is absent.      Uterus is absent.      No urethral tenderness or mass present.   Breasts:     Breasts are soft.     Right: No swelling, inverted nipple, mass, nipple discharge, skin change or tenderness.      Left: No swelling, inverted nipple, mass, nipple discharge, skin change or tenderness.   HENT:      Head: Normocephalic and atraumatic.   Cardiovascular:      Rate and Rhythm: Normal rate and regular rhythm.   Pulmonary:      Effort: Pulmonary effort is normal.      Breath sounds: Normal breath sounds.   Abdominal:      General: Bowel sounds are normal.      Palpations: Abdomen is soft.      Hernia: There is no hernia in the left inguinal area or right inguinal area.   Musculoskeletal:         General: Normal range of motion.      Cervical back: Normal range of motion and neck supple.   Lymphadenopathy:      Upper Body:      Right upper body: No supraclavicular or axillary adenopathy.      Left upper body: No supraclavicular or axillary adenopathy.      Lower Body: No right inguinal adenopathy. No left inguinal adenopathy.   Neurological:      Mental Status: She is alert and " oriented to person, place, and time.   Skin:     General: Skin is warm and dry.   Psychiatric:         Mood and Affect: Mood normal.   Vitals reviewed.         Assessment/Plan:   1. Normal well-woman GYN exam.     2. STD screening:  Patient declines.   3. Breast cancer screening:  Normal breast exam.  Order placed for bilateral screening mammogram.  Reviewed breast self-awareness.   4. Colon cancer screening:  Up to date.   5. BMI Counseling: Body mass index is 37.32 kg/m². Discussed the patient's BMI with her. The BMI is above normal. Nutrition recommendations include decreasing overall calorie intake, moderation in carbohydrate intake, increasing intake of lean protein, reducing intake of saturated fat and trans fat, and reducing intake of cholesterol.   6. Contraception:  hysterectomy   7. Return to office 1yr/prn.    Reviewed with patient that [facility], but that they will not necessarily be reviewed by me immediately.  Explained that I will review results at my earliest opportunity and contact patient appropriately.

## 2024-08-01 ENCOUNTER — OFFICE VISIT (OUTPATIENT)
Dept: DENTISTRY | Facility: CLINIC | Age: 59
End: 2024-08-01

## 2024-08-01 VITALS — DIASTOLIC BLOOD PRESSURE: 83 MMHG | HEART RATE: 79 BPM | TEMPERATURE: 97.5 F | SYSTOLIC BLOOD PRESSURE: 125 MMHG

## 2024-08-01 DIAGNOSIS — Z01.20 ENCOUNTER FOR DENTAL EXAMINATION: Primary | ICD-10-CM

## 2024-08-01 PROCEDURE — D1110 PROPHYLAXIS - ADULT: HCPCS | Performed by: DENTAL HYGIENIST

## 2024-08-01 PROCEDURE — D1330 ORAL HYGIENE INSTRUCTIONS: HCPCS | Performed by: DENTAL HYGIENIST

## 2024-08-01 NOTE — DENTAL PROCEDURE DETAILS
ASA  II  Pain - 0  Reviewed M/DH    Prophylaxis completed with ultrasonic  and hand instrumentation.    ---Heavy calc, plaque, and stain  ---Soft plaque removed and sub /supragingival calculus removed from all teeth.    ---Polished with prophy cup and paste.    ---Flossed and provided Oral Health Instructions.    ---Demonstrated proper brushing and flossing technique.    ---Patient left satisfied and ambulatory.  ---Pt stated that RCT #9 costs too much.  Please discuss further with patient at next visit.    Exam:  none  Referral:  none    NV1:  Rest 20 - DO - 60 min  NV2:  6mrc - 50 min          
PAST SURGICAL HISTORY:  No significant past surgical history

## 2024-09-05 ENCOUNTER — OFFICE VISIT (OUTPATIENT)
Dept: DENTISTRY | Facility: CLINIC | Age: 59
End: 2024-09-05

## 2024-09-05 VITALS — SYSTOLIC BLOOD PRESSURE: 141 MMHG | HEART RATE: 92 BPM | DIASTOLIC BLOOD PRESSURE: 79 MMHG

## 2024-09-05 DIAGNOSIS — Z01.20 ENCOUNTER FOR DENTAL EXAMINATION: Primary | ICD-10-CM

## 2024-09-05 PROCEDURE — D0191 ASSESSMENT OF A PATIENT: HCPCS

## 2024-09-05 NOTE — DENTAL PROCEDURE DETAILS
Assessment of Patient    Marbin Bowden 59 y.o. female presents with self to \[facility]\"" for assessment of patient. PMH reviewed, no changes, ASA II. Significant medical history: Reviewed. Significant allergies: Reviewed. Significant medications: Reviewed.    Chief complaint:  I want to replace my maxillary bridge and close my lower mandibular anteriors.    Consent:  Discussed that limited exam focuses on problem area, and same day tx is not guaranteed.  Patient explained to if they wish to have anything else evaluated, they need to return to the practice at which they are a patient of record or schedule a comprehensive exam afterwards.  Patient understands and consent was given by self via verbal consent.    Subjective history:    I feel like my mandibular teeth have moved    Objective clinical findings:   Oral cancer screening: normal.   Extraoral exam: no remarkable findings.  Intraoral exam: gingival inflammation, widespread on maxillary, erythematous palatal gingival margin. 5 mm probing depths noted. .     Radiographs: No new radiographs, films are current.     #9 PARL noted and also on #11. Generalized attrition on lower mandibular anteriors with flaring of teeth buccally.     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 Open margin on #9 noted. #9 previously treated    Plan:   Patient wants time to think on how to move forward due to financial considerations. Explained that replacing the bridge would entail RCT/RCT retreat, post and core, and new bridgework. Patient recommended for comprehensive tx planning session for an implant supported overdenture for maxillary. Patient explained that disease control should be done before an orthodontic consult,    Referral(s): None needed.  Rx: None.  Comprehensive care disposition:  Patient of record .    Patient dismissed ambulatory and alert.    NV: 6 MRC.    Attending: Dr. Romo examined pt.

## 2024-09-10 ENCOUNTER — TELEPHONE (OUTPATIENT)
Dept: FAMILY MEDICINE CLINIC | Facility: CLINIC | Age: 59
End: 2024-09-10

## 2024-09-10 NOTE — TELEPHONE ENCOUNTER
PCP SIGNATURE NEEDED FOR [facility] FORM RECEIVED VIA FAX AND PLACED IN PCP FOLDER TO BE DELIVERED AT ASSIGNED TIMES.         [employer]ified [employer]

## 2024-09-14 NOTE — TELEPHONE ENCOUNTER
FAXED ON 09/14/24 TO [facility] at [phone]. FAX CONFIRMATION RECEIVED.

## 2024-11-01 ENCOUNTER — OFFICE VISIT (OUTPATIENT)
Dept: OBGYN CLINIC | Facility: CLINIC | Age: 59
End: 2024-11-01

## 2024-11-01 VITALS
BODY MASS INDEX: 35.81 KG/M2 | DIASTOLIC BLOOD PRESSURE: 82 MMHG | HEART RATE: 80 BPM | HEIGHT: 66 IN | SYSTOLIC BLOOD PRESSURE: 122 MMHG | WEIGHT: 222.8 LBS

## 2024-11-01 DIAGNOSIS — B37.9 YEAST INFECTION: Primary | ICD-10-CM

## 2024-11-01 LAB
BV WHIFF TEST VAG QL: NEGATIVE
CLUE CELLS SPEC QL WET PREP: NEGATIVE
PH SMN: 4.5 [PH]
SL AMB POCT WET MOUNT: ABNORMAL
T VAGINALIS VAG QL WET PREP: NEGATIVE
YEAST VAG QL WET PREP: POSITIVE

## 2024-11-01 PROCEDURE — 99213 OFFICE O/P EST LOW 20 MIN: CPT | Performed by: NURSE PRACTITIONER

## 2024-11-01 PROCEDURE — 87210 SMEAR WET MOUNT SALINE/INK: CPT | Performed by: NURSE PRACTITIONER

## 2024-11-01 RX ORDER — FLUCONAZOLE 150 MG/1
150 TABLET ORAL ONCE
Qty: 1 TABLET | Refills: 1 | Status: SHIPPED | OUTPATIENT
Start: 2024-11-01 | End: 2024-11-01

## 2024-11-01 NOTE — PATIENT INSTRUCTIONS
Take Fluconazole as directed   Wear loose clothes and cotton underwear  Call with needs or concerns  Annual GYN exam is due after 7/18/2024

## 2024-11-01 NOTE — PROGRESS NOTES
"Assessment/Plan:     Diagnoses and all orders for this visit:    Yeast infection  -     POCT wet mount  -     fluconazole (DIFLUCAN) 150 mg tablet; Take 1 tablet (150 mg total) by mouth once for 1 dose          Subjective:      Patient ID: Marbin Bowden is a 59 y.o. female.    HPI  4/9/2022Pt states 5 days ago she had intercourse and she has noted vaginal discharge and vulvar and vaginalitching since  WNL PAP and negative HPV    Explained wet mount was positive for yeast, safe and effective use of Fluconazole was provided, discussed comfort measures        The following portions of the patient's history were reviewed and updated as appropriate: allergies, current medications, past family history, past medical history, past social history, past surgical history and problem list.    Review of Systems    .Pertinent items are note in the HPI      Objective:      /82 (BP Location: Right arm, Patient Position: Sitting)   Pulse 80   Ht 5' 6\" (1.676 m)   Wt 101 kg (222 lb 12.8 oz)   BMI 35.96 kg/m²          Physical Exam    Alert and oriented  Denies pain  WNL respiratory effort negative cough or SOB  Vulva negative lesions, slight erythema  Vagina erythema, positive thick white discharge   Cervix positive thick white discharge, negative lesions  Wet mount positive for yeast  "

## 2024-11-22 ENCOUNTER — TELEPHONE (OUTPATIENT)
Dept: FAMILY MEDICINE CLINIC | Facility: CLINIC | Age: 59
End: 2024-11-22

## 2024-11-22 DIAGNOSIS — I10 PRIMARY HYPERTENSION: ICD-10-CM

## 2024-11-22 NOTE — TELEPHONE ENCOUNTER
Patient called office today requesting medication refill:    candesartan (ATACAND) 8 MG tablet    Pt stated she has no more pills and can't go without them.  Appt has been scheduled for 11/29/24. Please advise. Thanks!

## 2024-11-25 RX ORDER — CANDESARTAN 8 MG/1
8 TABLET ORAL DAILY
Qty: 90 TABLET | Refills: 1 | Status: SHIPPED | OUTPATIENT
Start: 2024-11-25

## 2024-11-29 ENCOUNTER — OFFICE VISIT (OUTPATIENT)
Dept: FAMILY MEDICINE CLINIC | Facility: CLINIC | Age: 59
End: 2024-11-29

## 2024-11-29 VITALS
SYSTOLIC BLOOD PRESSURE: 130 MMHG | WEIGHT: 224.6 LBS | HEIGHT: 66 IN | RESPIRATION RATE: 16 BRPM | TEMPERATURE: 97.5 F | DIASTOLIC BLOOD PRESSURE: 70 MMHG | BODY MASS INDEX: 36.1 KG/M2 | OXYGEN SATURATION: 99 % | HEART RATE: 84 BPM

## 2024-11-29 DIAGNOSIS — A60.00 HERPES SIMPLEX INFECTION OF GENITOURINARY SYSTEM: ICD-10-CM

## 2024-11-29 DIAGNOSIS — E78.2 MIXED HYPERLIPIDEMIA: ICD-10-CM

## 2024-11-29 DIAGNOSIS — I10 PRIMARY HYPERTENSION: Primary | ICD-10-CM

## 2024-11-29 PROCEDURE — 99214 OFFICE O/P EST MOD 30 MIN: CPT

## 2024-11-29 RX ORDER — FLUCONAZOLE 150 MG/1
1 TABLET ORAL DAILY
COMMUNITY
Start: 2024-11-01

## 2024-11-29 NOTE — PROGRESS NOTES
"Name: Marbin Bowden      : 1965      MRN: [id number]  Encounter Provider: SELVIN Edmonds  Encounter Date: 2024   Encounter department: [facility]  :  Assessment & Plan  Primary hypertension  BP Readings from Last 3 Encounters:   24 130/70   24 122/82   24 141/79     - At goal. Continue candesartan         Mixed hyperlipidemia  Lab Results   Component Value Date    CHOLESTEROL 277 (H) 2022     Lab Results   Component Value Date    HDL 68 2022     Lab Results   Component Value Date    TRIG 106 2022     Lab Results   Component Value Date    NONHDLC 209 2022     Lab Results   Component Value Date    LDLCALC 188 (H) 2022     Check lipid panel. Continue Lipitor.         Herpes simplex infection of genitourinary system                History of Present Illness     Marbin Bowden is a 59 y.o. female  has a past medical history of Hypertension.  has a past surgical history that includes Hysterectomy and Tubal ligation.    Pt presents today for follow-up of HTN.      Review of Systems   Constitutional:  Negative for chills and fever.   HENT:  Negative for ear pain and sore throat.    Eyes:  Negative for pain and visual disturbance.   Respiratory:  Negative for cough and shortness of breath.    Cardiovascular:  Negative for chest pain and palpitations.   Gastrointestinal:  Negative for abdominal pain and vomiting.   Genitourinary:  Negative for dysuria and hematuria.   Musculoskeletal:  Negative for arthralgias and back pain.   Skin:  Negative for color change and rash.   Neurological:  Negative for seizures and syncope.   All other systems reviewed and are negative.         Objective   /70 (BP Location: Right arm, Patient Position: Sitting, Cuff Size: Standard)   Pulse 84   Temp 97.5 °F (36.4 °C) (Temporal)   Resp 16   Ht 5' 6\" (1.676 m)   Wt 102 kg (224 lb 9.6 oz)   SpO2 99%   BMI 36.25 " kg/m²      Physical Exam  Vitals and nursing note reviewed.   Constitutional:       General: She is not in acute distress.     Appearance: Normal appearance. She is well-developed. She is obese.   HENT:      Head: Normocephalic and atraumatic.      Right Ear: Tympanic membrane, ear canal and external ear normal.      Left Ear: Tympanic membrane, ear canal and external ear normal.      Nose: Nose normal.      Mouth/Throat:      Mouth: Mucous membranes are moist.      Pharynx: Oropharynx is clear. No oropharyngeal exudate or posterior oropharyngeal erythema.   Eyes:      Conjunctiva/sclera: Conjunctivae normal.   Cardiovascular:      Rate and Rhythm: Normal rate and regular rhythm.      Pulses: Normal pulses.      Heart sounds: Normal heart sounds. No murmur heard.  Pulmonary:      Effort: Pulmonary effort is normal. No respiratory distress.      Breath sounds: Normal breath sounds.   Abdominal:      General: Bowel sounds are normal.      Palpations: Abdomen is soft.      Tenderness: There is no abdominal tenderness.   Musculoskeletal:         General: Normal range of motion.      Cervical back: Normal range of motion and neck supple.   Skin:     General: Skin is warm and dry.   Neurological:      General: No focal deficit present.      Mental Status: She is alert and oriented to person, place, and time. Mental status is at baseline.   Psychiatric:         Mood and Affect: Mood normal.         Behavior: Behavior normal.         Thought Content: Thought content normal.         Judgment: Judgment normal.

## 2024-11-29 NOTE — ASSESSMENT & PLAN NOTE
BP Readings from Last 3 Encounters:   11/29/24 130/70   11/01/24 122/82   09/05/24 141/79     - At goal. Continue candesartan

## 2024-11-29 NOTE — ASSESSMENT & PLAN NOTE
Lab Results   Component Value Date    CHOLESTEROL 277 (H) 06/01/2022     Lab Results   Component Value Date    HDL 68 06/01/2022     Lab Results   Component Value Date    TRIG 106 06/01/2022     Lab Results   Component Value Date    NONHDLC 209 06/01/2022     Lab Results   Component Value Date    LDLCALC 188 (H) 06/01/2022     Check lipid panel. Continue Lipitor.

## 2024-12-12 ENCOUNTER — PATIENT OUTREACH (OUTPATIENT)
Facility: HOSPITAL | Age: 59
End: 2024-12-12

## 2025-03-06 ENCOUNTER — OFFICE VISIT (OUTPATIENT)
Dept: DENTISTRY | Facility: CLINIC | Age: 60
End: 2025-03-06

## 2025-03-06 VITALS — TEMPERATURE: 98 F | HEART RATE: 70 BPM | SYSTOLIC BLOOD PRESSURE: 117 MMHG | DIASTOLIC BLOOD PRESSURE: 80 MMHG

## 2025-03-06 DIAGNOSIS — K03.6 DENTAL CALCULUS: ICD-10-CM

## 2025-03-06 DIAGNOSIS — Z01.20 ENCOUNTER FOR DENTAL EXAMINATION: Primary | ICD-10-CM

## 2025-03-06 DIAGNOSIS — K03.6 ACCRETIONS ON TEETH: ICD-10-CM

## 2025-03-06 DIAGNOSIS — K02.9 DENTAL CARIES: ICD-10-CM

## 2025-03-06 PROCEDURE — D1110 PROPHYLAXIS - ADULT: HCPCS

## 2025-03-06 NOTE — PROGRESS NOTES
PERIODIC EXAM, ADULT PROPHY , (no xrays due )   Translation used [id number] BAILEE   25 min spent on translation       I had to explain we would need to bring her back to discuss her concerns with a DR because we did not have much time left for prophy    patient agreed    She had a comp exam and discussed extensive treatment to replace max bridge 9/5/2024   finances are an issue for her  --  questionable prognosis for # 9, 11 and max bridge   She does not have funds for implant retained denture discussed at last visit but does not like idea of removable appliance, either.      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   Patient consented to treatment    REVIEWED MED HX: meds, allergies, health changes reviewed in [facility]. All consents signed.    Patient is poor historian with medications    is aware she cannot take penicillin, but discontinued other meds without DR's knowledge  I sugg she contact PCP to update meds.    CHIEF CONCERN: *worried about max. bridge  PAIN SCALE:  0  ASA CLASS:  II  PLAQUE:  moderate- light  CALCULUS:  moderate  BLEEDING:   light  STAIN :   light      PERIO: stage 2 grade B    Hygiene Procedures:  Scaled, Polished, Flossed and Used Cavitron    Oral Hygiene Instruction: Brushing Minimum 2x daily for 2 minutes, daily flossing and Recommended soft toothbrush only    Dispensed: Toothbrush, Toothpaste, Floss and Flossers    Visual and Tactile Intraoral/ Extraoral evaluation: Oral and Oropharyngeal cancer evaluation. 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

## 2025-08-12 ENCOUNTER — OFFICE VISIT (OUTPATIENT)
Dept: DENTISTRY | Facility: CLINIC | Age: 60
End: 2025-08-12